# Patient Record
Sex: FEMALE | Race: WHITE | NOT HISPANIC OR LATINO | Employment: FULL TIME | URBAN - METROPOLITAN AREA
[De-identification: names, ages, dates, MRNs, and addresses within clinical notes are randomized per-mention and may not be internally consistent; named-entity substitution may affect disease eponyms.]

---

## 2018-06-09 ENCOUNTER — OFFICE VISIT (OUTPATIENT)
Dept: URGENT CARE | Facility: PHYSICIAN GROUP | Age: 43
End: 2018-06-09
Payer: COMMERCIAL

## 2018-06-09 VITALS
DIASTOLIC BLOOD PRESSURE: 64 MMHG | BODY MASS INDEX: 20.3 KG/M2 | OXYGEN SATURATION: 99 % | HEIGHT: 71 IN | HEART RATE: 89 BPM | TEMPERATURE: 100 F | WEIGHT: 145 LBS | SYSTOLIC BLOOD PRESSURE: 110 MMHG

## 2018-06-09 DIAGNOSIS — R50.9 FEVER CHILLS: ICD-10-CM

## 2018-06-09 DIAGNOSIS — J02.8 PHARYNGITIS DUE TO OTHER ORGANISM: ICD-10-CM

## 2018-06-09 LAB
INT CON NEG: NEGATIVE
INT CON POS: POSITIVE
S PYO AG THROAT QL: NEGATIVE

## 2018-06-09 PROCEDURE — 87880 STREP A ASSAY W/OPTIC: CPT | Performed by: FAMILY MEDICINE

## 2018-06-09 PROCEDURE — 99203 OFFICE O/P NEW LOW 30 MIN: CPT | Performed by: FAMILY MEDICINE

## 2018-06-09 RX ORDER — AMOXICILLIN 875 MG/1
875 TABLET, COATED ORAL 2 TIMES DAILY
Qty: 20 TAB | Refills: 0 | Status: SHIPPED | OUTPATIENT
Start: 2018-06-09 | End: 2018-06-19

## 2018-06-09 ASSESSMENT — ENCOUNTER SYMPTOMS
CHILLS: 1
HEADACHES: 1
DIZZINESS: 0
FEVER: 1
SORE THROAT: 1
EYES NEGATIVE: 1
COUGH: 0
CARDIOVASCULAR NEGATIVE: 1

## 2018-06-09 NOTE — PROGRESS NOTES
"Subjective:      Sydni Hanson is a 43 y.o. female who presents with Pharyngitis (sinus congestion, headache x3days)    Patient presents to urgent care with acute onset of a new problem of sore throat fevers chills body aches for the past 3 days. She's had a headache minimal nasal congestion. She denies any nausea vomiting or diarrhea.      HPI  Review of Systems   Constitutional: Positive for chills, fever and malaise/fatigue.   HENT: Positive for congestion and sore throat.    Eyes: Negative.    Respiratory: Negative for cough.    Cardiovascular: Negative.    Skin: Negative for itching and rash.   Neurological: Positive for headaches. Negative for dizziness.   All other systems reviewed and are negative.     PMH:  has no past medical history on file.  MEDS:   Current Outpatient Prescriptions:   •  mag hydrox-al hydrox-simeth-diphenhydrAMINE-lidocaine viscous 2%, Benadyl/Mylanta or Maalox/Xylocaine (1:1:1:) 5-10cc po gargle and spit or swallow q4-6hrs prn sore throat, Disp: 90 mL, Rfl: 0  •  amoxicillin (AMOXIL) 875 MG tablet, Take 1 Tab by mouth 2 times a day for 10 days. With food, Disp: 20 Tab, Rfl: 0  ALLERGIES: Not on File  SURGHX: History reviewed. No pertinent surgical history.  SOCHX:  reports that she has never smoked. She has never used smokeless tobacco. She reports that she drinks alcohol. She reports that she does not use drugs.  FH: Family history was reviewed, no pertinent findings to report     Objective:     /64   Pulse 89   Temp 37.8 °C (100 °F)   Ht 1.803 m (5' 11\")   Wt 65.8 kg (145 lb)   SpO2 99%   BMI 20.22 kg/m²      Physical Exam   Constitutional: She is oriented to person, place, and time. She appears well-developed and well-nourished. No distress.   HENT:   Moderate pharyngeal erythema with mild exudate   Eyes: Conjunctivae are normal.   Neck: Normal range of motion.   Cardiovascular: Normal rate, regular rhythm, normal heart sounds and intact distal pulses.  Exam reveals no " gallop and no friction rub.    No murmur heard.  Pulmonary/Chest: Effort normal and breath sounds normal. No respiratory distress. She has no wheezes. She has no rales. She exhibits no tenderness.   Musculoskeletal: Normal range of motion.   Lymphadenopathy:     She has cervical adenopathy.   Neurological: She is alert and oriented to person, place, and time.   Skin: Skin is warm and dry. No rash noted. She is not diaphoretic. No erythema. No pallor.   Psychiatric: She has a normal mood and affect. Her behavior is normal. Judgment and thought content normal.       Diagnostics: Rapid strep negative   Assessment/Plan:     1. Pharyngitis due to other organism    - POCT Rapid Strep A  - mag hydrox-al hydrox-simeth-diphenhydrAMINE-lidocaine viscous 2%; Benadyl/Mylanta or Maalox/Xylocaine (1:1:1:) 5-10cc po gargle and spit or swallow q4-6hrs prn sore throat  Dispense: 90 mL; Refill: 0  - amoxicillin (AMOXIL) 875 MG tablet; Take 1 Tab by mouth 2 times a day for 10 days. With food  Dispense: 20 Tab; Refill: 0    2. Fever    Continue antipyretics when necessary  Differential diagnosis, natural history, supportive care discussed. Follow-up with primary care provider within 7-10 days, emergency room precautions discussed.  Patient and/or family appears understanding of information.

## 2018-07-31 ENCOUNTER — NON-PROVIDER VISIT (OUTPATIENT)
Dept: URGENT CARE | Facility: PHYSICIAN GROUP | Age: 43
End: 2018-07-31

## 2018-07-31 PROCEDURE — 8907 PR URINE COLLECT ONLY: Performed by: PHYSICIAN ASSISTANT

## 2019-01-19 ENCOUNTER — OFFICE VISIT (OUTPATIENT)
Dept: URGENT CARE | Facility: PHYSICIAN GROUP | Age: 44
End: 2019-01-19
Payer: COMMERCIAL

## 2019-01-19 ENCOUNTER — HOSPITAL ENCOUNTER (OUTPATIENT)
Facility: MEDICAL CENTER | Age: 44
End: 2019-01-19
Attending: FAMILY MEDICINE
Payer: COMMERCIAL

## 2019-01-19 VITALS
BODY MASS INDEX: 20.16 KG/M2 | HEIGHT: 71 IN | DIASTOLIC BLOOD PRESSURE: 76 MMHG | OXYGEN SATURATION: 99 % | WEIGHT: 144 LBS | HEART RATE: 90 BPM | SYSTOLIC BLOOD PRESSURE: 100 MMHG | RESPIRATION RATE: 14 BRPM | TEMPERATURE: 98.2 F

## 2019-01-19 DIAGNOSIS — N30.00 ACUTE CYSTITIS WITHOUT HEMATURIA: ICD-10-CM

## 2019-01-19 LAB
APPEARANCE UR: NORMAL
BILIRUB UR STRIP-MCNC: NEGATIVE MG/DL
COLOR UR AUTO: YELLOW
GLUCOSE UR STRIP.AUTO-MCNC: NORMAL MG/DL
KETONES UR STRIP.AUTO-MCNC: NORMAL MG/DL
LEUKOCYTE ESTERASE UR QL STRIP.AUTO: NEGATIVE
NITRITE UR QL STRIP.AUTO: NEGATIVE
PH UR STRIP.AUTO: 7 [PH] (ref 5–8)
PROT UR QL STRIP: NORMAL MG/DL
RBC UR QL AUTO: NORMAL
SP GR UR STRIP.AUTO: 1015
UROBILINOGEN UR STRIP-MCNC: 0.2 MG/DL

## 2019-01-19 PROCEDURE — 81002 URINALYSIS NONAUTO W/O SCOPE: CPT | Performed by: FAMILY MEDICINE

## 2019-01-19 PROCEDURE — 99213 OFFICE O/P EST LOW 20 MIN: CPT | Performed by: FAMILY MEDICINE

## 2019-01-19 PROCEDURE — 87086 URINE CULTURE/COLONY COUNT: CPT

## 2019-01-19 RX ORDER — NITROFURANTOIN 25; 75 MG/1; MG/1
100 CAPSULE ORAL EVERY 12 HOURS
Qty: 10 CAP | Refills: 0 | Status: SHIPPED | OUTPATIENT
Start: 2019-01-19 | End: 2019-01-24

## 2019-01-19 ASSESSMENT — ENCOUNTER SYMPTOMS
SHORTNESS OF BREATH: 0
COUGH: 0
ABDOMINAL PAIN: 0
FEVER: 0
HEADACHES: 0
SORE THROAT: 0
VOMITING: 0
CHILLS: 0
NAUSEA: 0
DIARRHEA: 0

## 2019-01-19 NOTE — PROGRESS NOTES
"Subjective:     Sydni Hanson is a 43 y.o. female who presents for Dysuria (tingling burning )    HPI  Pt presents for evaluation of a new problem  Pt with a concern for UTI the past 2 days   Pt with discomfort when urinating   Has decreased urine volume   Denies hematuria  No abdominal pain or flank pain  No vaginal discharge or bleeding  Not feeling systemically ill  Does admit to holding her urine frequently during work    Review of Systems   Constitutional: Negative for chills and fever.   HENT: Negative for congestion and sore throat.    Respiratory: Negative for cough and shortness of breath.    Cardiovascular: Negative for chest pain.   Gastrointestinal: Negative for abdominal pain, diarrhea, nausea and vomiting.   Genitourinary: Positive for dysuria, frequency and urgency.   Skin: Negative for rash.   Neurological: Negative for headaches.       PMH: History of UTIs  MEDS:   Current Outpatient Prescriptions:   •  mag hydrox-al hydrox-simeth-diphenhydrAMINE-lidocaine viscous 2%, Benadyl/Mylanta or Maalox/Xylocaine (1:1:1:) 5-10cc po gargle and spit or swallow q4-6hrs prn sore throat, Disp: 90 mL, Rfl: 0  ALLERGIES: No Known Allergies  SURGHX: No past surgical history on file.  SOCHX:  reports that she has never smoked. She has never used smokeless tobacco. She reports that she drinks alcohol. She reports that she does not use drugs.  FH: Family history was reviewed, not contributing to acute illness     Objective:   /76   Pulse 90   Temp 36.8 °C (98.2 °F) (Temporal)   Resp 14   Ht 1.803 m (5' 11\")   Wt 65.3 kg (144 lb)   SpO2 99%   BMI 20.08 kg/m²     Physical Exam   Constitutional: She appears well-developed and well-nourished. No distress.   HENT:   Head: Normocephalic and atraumatic.   Eyes: Conjunctivae and EOM are normal.   Neck: Normal range of motion. No tracheal deviation present.   Abdominal: Soft. Bowel sounds are normal. She exhibits no distension and no mass. There is no rebound and " no guarding.   Mild suprapubic tenderness  No CVA tenderness   Neurological: She is alert. Coordination normal.   Skin: Skin is warm and dry. No rash noted. She is not diaphoretic.   Psychiatric: She has a normal mood and affect. Her behavior is normal. Judgment and thought content normal.     Assessment/Plan:   Assessment    1. Acute cystitis without hematuria  Patient is a 43-year-old female with history and exam consistent with UTI.  Urine dip borderline.  Reviewed risks and benefits of empiric treatment versus waiting for culture results.  Patient prefers to empirically treat.  Will send urine for culture.  Follow-up as needed.  - POCT Urinalysis [[id number]]  - Urine Culture [[id number]]; Future  - nitrofurantoin monohydr macro (MACROBID) 100 MG Cap; Take 1 Cap by mouth every 12 hours for 5 days.  Dispense: 10 Cap; Refill: 0

## 2019-01-21 LAB
BACTERIA UR CULT: NORMAL
SIGNIFICANT IND 70042: NORMAL
SITE SITE: NORMAL
SOURCE SOURCE: NORMAL

## 2019-02-07 ENCOUNTER — APPOINTMENT (RX ONLY)
Dept: URBAN - METROPOLITAN AREA CLINIC 36 | Facility: CLINIC | Age: 44
Setting detail: DERMATOLOGY
End: 2019-02-07

## 2019-02-07 ENCOUNTER — RX ONLY (OUTPATIENT)
Age: 44
Setting detail: RX ONLY
End: 2019-02-07

## 2019-02-07 DIAGNOSIS — Z41.9 ENCOUNTER FOR PROCEDURE FOR PURPOSES OTHER THAN REMEDYING HEALTH STATE, UNSPECIFIED: ICD-10-CM

## 2019-02-07 PROCEDURE — ? SCITON BBL

## 2019-02-07 PROCEDURE — ? ADDITIONAL NOTES

## 2019-02-07 ASSESSMENT — LOCATION SIMPLE DESCRIPTION DERM: LOCATION SIMPLE: RIGHT FOREHEAD

## 2019-02-07 ASSESSMENT — LOCATION ZONE DERM: LOCATION ZONE: FACE

## 2019-02-07 ASSESSMENT — LOCATION DETAILED DESCRIPTION DERM: LOCATION DETAILED: RIGHT MEDIAL FOREHEAD

## 2019-02-07 NOTE — PROCEDURE: SCITON BBL
Fluence (J/Cm2): 12
Anesthesia Volume In Cc: 0
Treatment Number: 1
Cooling (In C): 20
Cooling (In C): 15
Spot Size: Finesse Adapter Size: 15 x 15 mm square
Indication Override (Will Not Show Above If Text Entered): vascular
Pulse Duration Units: milliseconds
Cooling ?: Yes
Price (Use Numbers Only, No Special Characters Or $): 350.00
Spot Size: Finesse Adapter Size: 7 mm round
Post-Care Instructions: I reviewed with the patient in detail post-care instructions. Patient should stay away from the sun and wear sun protection until treated areas are fully healed.
Detail Level: Zone
Spot Size: Finesse Adapter Size: 15 x 45 mm (No Finesse Adapter)
Consent: Written consent obtained, risks reviewed including but not limited to crusting, scabbing, blistering, scarring, darker or lighter pigmentary change, bruising, and/or incomplete response.
Length Of Topical Anesthesia Application (Optional): 30 minutes
Fluence (J/Cm2): 14
Fluence (J/Cm2): 25
Post Procedure Text: The patient tolerated the procedure well. Post care was reviewed with the patient.
Pulse Duration: 10
Topical Anesthesia?: 23% lidocaine, 7% tetracaine
Preprocedure Text: The treatment areas were thoroughly cleaned. Clear ultrasound gel was applied to the treatment area. The area was treated with no immediate stacking of pulses.

## 2019-02-07 NOTE — PROCEDURE: ADDITIONAL NOTES
Detail Level: Simple
Additional Notes: If patient does not see improvement from BBL treatment, it was suggested she come back for 1927 full face and neck for her next treatment, possible Melasma. Quoted $600.00. Patient was also sent home with 4% .